# Patient Record
Sex: FEMALE | Race: OTHER | HISPANIC OR LATINO | Employment: UNEMPLOYED | URBAN - NONMETROPOLITAN AREA
[De-identification: names, ages, dates, MRNs, and addresses within clinical notes are randomized per-mention and may not be internally consistent; named-entity substitution may affect disease eponyms.]

---

## 2023-10-10 ENCOUNTER — HOSPITAL ENCOUNTER (EMERGENCY)
Facility: HOSPITAL | Age: 33
Discharge: HOME | End: 2023-10-11
Attending: EMERGENCY MEDICINE | Admitting: EMERGENCY MEDICINE
Payer: COMMERCIAL

## 2023-10-10 VITALS
HEART RATE: 78 BPM | TEMPERATURE: 97.8 F | RESPIRATION RATE: 18 BRPM | HEIGHT: 65 IN | BODY MASS INDEX: 15.83 KG/M2 | SYSTOLIC BLOOD PRESSURE: 130 MMHG | WEIGHT: 95 LBS | DIASTOLIC BLOOD PRESSURE: 53 MMHG | OXYGEN SATURATION: 97 %

## 2023-10-10 DIAGNOSIS — R21 RASH AND NONSPECIFIC SKIN ERUPTION: Primary | ICD-10-CM

## 2023-10-10 DIAGNOSIS — R11.2 NAUSEA AND VOMITING: ICD-10-CM

## 2023-10-10 DIAGNOSIS — L03.221 CELLULITIS, NECK: ICD-10-CM

## 2023-10-10 LAB
EXT PREGNANCY TEST URINE: NEGATIVE
EXT. CONTROL: NORMAL

## 2023-10-10 PROCEDURE — 96372 THER/PROPH/DIAG INJ SC/IM: CPT

## 2023-10-10 PROCEDURE — 81025 URINE PREGNANCY TEST: CPT | Performed by: EMERGENCY MEDICINE

## 2023-10-10 PROCEDURE — 99284 EMERGENCY DEPT VISIT MOD MDM: CPT | Performed by: EMERGENCY MEDICINE

## 2023-10-10 PROCEDURE — 99282 EMERGENCY DEPT VISIT SF MDM: CPT

## 2023-10-10 PROCEDURE — 81001 URINALYSIS AUTO W/SCOPE: CPT | Performed by: EMERGENCY MEDICINE

## 2023-10-10 RX ORDER — KETOROLAC TROMETHAMINE 30 MG/ML
15 INJECTION, SOLUTION INTRAMUSCULAR; INTRAVENOUS ONCE
Status: DISCONTINUED | OUTPATIENT
Start: 2023-10-10 | End: 2023-10-10

## 2023-10-10 RX ORDER — CEPHALEXIN 250 MG/1
500 CAPSULE ORAL ONCE
Status: COMPLETED | OUTPATIENT
Start: 2023-10-10 | End: 2023-10-10

## 2023-10-10 RX ORDER — KETOROLAC TROMETHAMINE 30 MG/ML
15 INJECTION, SOLUTION INTRAMUSCULAR; INTRAVENOUS ONCE
Status: COMPLETED | OUTPATIENT
Start: 2023-10-11 | End: 2023-10-10

## 2023-10-10 RX ORDER — ONDANSETRON 4 MG/1
4 TABLET, ORALLY DISINTEGRATING ORAL ONCE
Status: COMPLETED | OUTPATIENT
Start: 2023-10-11 | End: 2023-10-10

## 2023-10-10 RX ORDER — PREDNISONE 20 MG/1
40 TABLET ORAL ONCE
Status: COMPLETED | OUTPATIENT
Start: 2023-10-10 | End: 2023-10-10

## 2023-10-10 RX ADMIN — ONDANSETRON 4 MG: 4 TABLET, ORALLY DISINTEGRATING ORAL at 23:48

## 2023-10-10 RX ADMIN — PREDNISONE 40 MG: 20 TABLET ORAL at 23:43

## 2023-10-10 RX ADMIN — KETOROLAC TROMETHAMINE 15 MG: 30 INJECTION, SOLUTION INTRAMUSCULAR at 23:49

## 2023-10-10 RX ADMIN — CEPHALEXIN 500 MG: 250 CAPSULE ORAL at 23:43

## 2023-10-11 LAB
AMORPH URATE CRY URNS QL MICRO: ABNORMAL /HPF
BACTERIA UR QL AUTO: ABNORMAL /HPF
BILIRUB UR QL STRIP: NEGATIVE
CLARITY UR: CLEAR
COLOR UR: YELLOW
GLUCOSE UR STRIP-MCNC: NEGATIVE MG/DL
HGB UR QL STRIP.AUTO: NEGATIVE
KETONES UR STRIP-MCNC: NEGATIVE MG/DL
LEUKOCYTE ESTERASE UR QL STRIP: NEGATIVE
NITRITE UR QL STRIP: NEGATIVE
NON-SQ EPI CELLS URNS QL MICRO: ABNORMAL /HPF
PH UR STRIP.AUTO: 8.5 [PH]
PROT UR STRIP-MCNC: ABNORMAL MG/DL
RBC #/AREA URNS AUTO: ABNORMAL /HPF
SP GR UR STRIP.AUTO: 1.01 (ref 1–1.03)
UROBILINOGEN UR QL STRIP.AUTO: 0.2 E.U./DL
WBC #/AREA URNS AUTO: ABNORMAL /HPF

## 2023-10-11 RX ORDER — PREDNISONE 20 MG/1
40 TABLET ORAL DAILY
Qty: 10 TABLET | Refills: 0 | Status: SHIPPED | OUTPATIENT
Start: 2023-10-10 | End: 2023-10-15

## 2023-10-11 RX ORDER — ONDANSETRON 4 MG/1
4 TABLET, FILM COATED ORAL EVERY 6 HOURS
Qty: 12 TABLET | Refills: 0 | Status: SHIPPED | OUTPATIENT
Start: 2023-10-10

## 2023-10-11 RX ORDER — CEPHALEXIN 500 MG/1
500 CAPSULE ORAL EVERY 6 HOURS SCHEDULED
Qty: 28 CAPSULE | Refills: 0 | Status: SHIPPED | OUTPATIENT
Start: 2023-10-10 | End: 2023-10-17

## 2023-10-11 RX ORDER — NAPROXEN 500 MG/1
500 TABLET ORAL 2 TIMES DAILY WITH MEALS
Qty: 30 TABLET | Refills: 0 | Status: SHIPPED | OUTPATIENT
Start: 2023-10-11

## 2023-10-11 NOTE — DISCHARGE INSTRUCTIONS
You have been seen for a rash and vomiting. Please complete the course of antibiotics and steroids as prescribed. Continue taking tylenol, naproxen and benadryl as needed for your symptoms. Return to the emergency department if you develop worsening rash, fevers, vomiting, abdominal pain or any other symptoms of concern. Please follow up with a PCP by calling the number provided.

## 2023-10-11 NOTE — ED NOTES
Provider and myself talked to patient who then refused lab work. Patient states she just wants to leave now.       Zane Michaels  10/11/23 0034

## 2023-10-11 NOTE — ED PROVIDER NOTES
History  Chief Complaint   Patient presents with   • Rash     Patient started with a rash 2 days ago and reports it burns. Patient states she has been sick for the past few days as well. Chandu Jean is a 28y.o. year old female with PMH of asthma presenting to the [facility]note 3[facility] ED for cough, body aches, vomiting and a neck/head rash. Patient reporting myalgias, cough, nausea/vomiting and abdominal discomfort for the past week. No reported sick contacts. No fevers/chills. Patient denies chest pain or dyspnea. For past three days she has noticed rash on the back of the neck and then spread toward the head. Rash is described as a burning sensation and is itchy. Patient has taken tylenol, benadryl, naproxen and OTC cold/flu medication at home for symptomatic treatment. No recent travel other than moving to area from [place] three weeks ago. No new detergents, shampoo or perfumes. NKDA. NKFA. No Hx IVDU. Patient does not take any medications daily. History provided by:  Medical records and patient   used: No    Rash  Associated symptoms: abdominal pain, myalgias, nausea and vomiting    Associated symptoms: no diarrhea, no fever and no shortness of breath        None       History reviewed. No pertinent past medical history. History reviewed. No pertinent surgical history. History reviewed. No pertinent family history. I have reviewed and agree with the history as documented. E-Cigarette/Vaping   • E-Cigarette Use Never User      E-Cigarette/Vaping Substances     Social History     Tobacco Use   • Smoking status: Every Day     Packs/day: 0.50     Types: Cigarettes   • Smokeless tobacco: Never   Vaping Use   • Vaping Use: Never used   Substance Use Topics   • Alcohol use: Not Currently   • Drug use: Yes     Types: Marijuana     Comment: Medical       Review of Systems   Constitutional: Negative for chills and fever. Respiratory: Positive for cough. Negative for shortness of breath. Cardiovascular: Negative for chest pain and leg swelling. Gastrointestinal: Positive for abdominal pain, nausea and vomiting. Negative for diarrhea. Genitourinary: Negative for dysuria, flank pain and hematuria. Musculoskeletal: Positive for myalgias. Skin: Positive for rash. Neurological: Negative for weakness. All other systems reviewed and are negative. Physical Exam  Physical Exam  Vitals and nursing note reviewed. Constitutional:       General: She is not in acute distress. Appearance: Normal appearance. She is well-developed. She is not ill-appearing, toxic-appearing or diaphoretic. HENT:      Head: Normocephalic and atraumatic. Nose: No congestion or rhinorrhea. Eyes:      General:         Right eye: No discharge. Left eye: No discharge. Cardiovascular:      Rate and Rhythm: Normal rate and regular rhythm. Pulmonary:      Effort: Pulmonary effort is normal. No accessory muscle usage or respiratory distress. Breath sounds: Normal breath sounds. No stridor. No decreased breath sounds, wheezing, rhonchi or rales. Abdominal:      General: There is no distension. Palpations: Abdomen is soft. Tenderness: There is abdominal tenderness in the suprapubic area. There is no right CVA tenderness, left CVA tenderness, guarding or rebound. Musculoskeletal:      Cervical back: Normal range of motion and neck supple. No rigidity. Right lower leg: No tenderness. No edema. Left lower leg: No tenderness. No edema. Skin:     Capillary Refill: Capillary refill takes less than 2 seconds. Findings: Rash (urticarial rash bilateral posterior neck extending to postauricular region and scalp. scant clear exudate in posterior ear fold.) present. No abscess or petechiae. Rash is not vesicular. Comments: No desquamation of skin. Neurological:      Mental Status: She is alert and oriented to person, place, and time. GCS: GCS eye subscore is 4.  GCS verbal subscore is 5. GCS motor subscore is 6. Sensory: Sensation is intact. Motor: Motor function is intact.    Psychiatric:         Mood and Affect: Mood normal.         Behavior: Behavior normal.             Vital Signs  ED Triage Vitals   Temperature Pulse Respirations Blood Pressure SpO2   10/10/23 2250 10/10/23 2250 10/10/23 2250 10/10/23 2336 10/10/23 2250   97.8 °F (36.6 °C) 78 18 130/53 97 %      Temp Source Heart Rate Source Patient Position - Orthostatic VS BP Location FiO2 (%)   10/10/23 2250 10/10/23 2250 -- -- --   Temporal Monitor         Pain Score       10/10/23 2250       10 - Worst Possible Pain           Vitals:    10/10/23 2250 10/10/23 2336   BP:  130/53   Pulse: 78          Visual Acuity      ED Medications  Medications   predniSONE tablet 40 mg (40 mg Oral Given 10/10/23 2343)   cephalexin (KEFLEX) capsule 500 mg (500 mg Oral Given 10/10/23 2343)   ondansetron (ZOFRAN-ODT) dispersible tablet 4 mg (4 mg Oral Given 10/10/23 2348)   ketorolac (TORADOL) injection 15 mg (15 mg Intramuscular Given 10/10/23 2349)       Diagnostic Studies  Results Reviewed     Procedure Component Value Units Date/Time    Urine Microscopic [[id number]]  (Abnormal) Collected: 10/10/23 2335    Lab Status: Final result Specimen: Urine, Clean Catch Updated: 10/11/23 0035     RBC, UA 0-5 /hpf      WBC, UA 0-1 /hpf      Epithelial Cells Moderate /hpf      Bacteria, UA Moderate /hpf      AMORPH URATES Moderate /hpf     CBC and differential [[id number]]     Lab Status: No result Specimen: Blood     Comprehensive metabolic panel [[id number]]     Lab Status: No result Specimen: Blood     Lipase [[id number]]     Lab Status: No result Specimen: Blood     UA w Reflex to Microscopic w Reflex to Culture [[id number]]  (Abnormal) Collected: 10/10/23 2335    Lab Status: Final result Specimen: Urine, Clean Catch Updated: 10/11/23 0007     Color, UA Yellow     Clarity, UA Clear     Specific Gravity, UA 1.015     pH, UA 8.5 Leukocytes, UA Negative     Nitrite, UA Negative     Protein, UA Trace mg/dl      Glucose, UA Negative mg/dl      Ketones, UA Negative mg/dl      Urobilinogen, UA 0.2 E.U./dl      Bilirubin, UA Negative     Occult Blood, UA Negative    POCT pregnancy, urine [[id number]]  (Normal) Resulted: 10/10/23 2340    Lab Status: Final result Updated: 10/10/23 2340     EXT Preg Test, Ur Negative     Control Valid                 No orders to display              Procedures  Procedures         ED Course  ED Course as of 10/11/23 0040   Wed Oct 11, 2023   0023 Patient stating she would like to have blood work performed at this time which was ordered. On repeat evaluation patient stating she does not wish to have blood work done at this time and would like to leave the ED. I reiterated to the patient that we would be happy to check labs and perform further evaluation. Patient again stated she wanted to leave ED at this time. SBIRT 22yo+    Flowsheet Row Most Recent Value   Initial Alcohol Screen: US AUDIT-C     1. How often do you have a drink containing alcohol? 0 Filed at: 10/10/2023 2250   Audit-C Score 0 Filed at: 10/10/2023 2250   ALVA: How many times in the past year have you. .. Used an illegal drug or used a prescription medication for non-medical reasons? Never Filed at: 10/10/2023 2250                    Medical Decision Making    28 y.o. female presenting for cough, vomiting, myalgias and also for posterior neck rash. VSS, nontoxic appearing. Symptoms of cough, myalgias and vomiting mostly like related to viral illness. No RUQ tenderness do not suspect cholecystitis. No RLQ tenderness do not suspect appendicitis. Given suprapubic discomfort will check UA/Upreg. Rash appears urticarial in nature. Will treat with course of prednisone. Scant exudate in ear folds, will treat with course of cephalexin. Reassessment: reviewed UA results.  Moderate bacteria/epithelial cells however treating with cepahlexin regardless. Patient requested that blood work be performed then later stated she did not wish to remain in ED for further evaluation. Disposition: I have discussed with the patient our plan to discharge them from the ED and the patient is in agreement with this plan. Discharge Plan: Rx for prednisone, cephalexin, naproxen, zofran. Continue PRN benadryl and tylenol. RTED precautions emphasized. The patient was provided a written after visit summary with strict RTED precautions. Followup: I have discussed with the patient plan to follow up with a PCP. Contact information provided in AVS.    Amount and/or Complexity of Data Reviewed  Labs: ordered. Risk  Prescription drug management. Disposition  Final diagnoses:   Rash and nonspecific skin eruption   Cellulitis, neck   Nausea and vomiting     Time reflects when diagnosis was documented in both MDM as applicable and the Disposition within this note     Time User Action Codes Description Comment    10/10/2023 11:41 PM Sherman Allyssa Add [R21] Rash and nonspecific skin eruption     10/10/2023 11:42 PM Sherman Allyssa Add [P02.909] Cellulitis, neck     10/10/2023 11:42 PM Sherman Allyssa Add [R11.2] Nausea and vomiting       ED Disposition     ED Disposition   Discharge    Condition   Stable    Date/Time   Wed Oct 11, 2023 12:07 AM    [address] discharge to home/self care. Follow-up Information     Follow up With Specialties Details Why Contact Info Additional [facility] Schedule an appointment as soon as possible for a visit  To make appointment for reevaluation in 3-5 days.  [address] [facility] [address]  [address] [place] [address], [address] [place] [address]          Discharge Medication List as of 10/11/2023 12:08 AM      START taking these medications    Details   cephalexin (KEFLEX) 500 mg capsule Take 1 capsule (500 mg total) by mouth every 6 (six) hours for 7 days, Starting Tue 10/10/2023, Until Tue 10/17/2023, Normal      ondansetron (ZOFRAN) 4 mg tablet Take 1 tablet (4 mg total) by mouth every 6 (six) hours, Starting Tue 10/10/2023, Normal      predniSONE 20 mg tablet Take 2 tablets (40 mg total) by mouth daily for 5 days, Starting Tue 10/10/2023, Until Sun 10/15/2023, Normal                 PDMP Review     None          ED Provider  Electronically Signed by           Yasmeen Mcfarlane DO  10/11/23 0040